# Patient Record
Sex: FEMALE | Race: WHITE | HISPANIC OR LATINO | URBAN - METROPOLITAN AREA
[De-identification: names, ages, dates, MRNs, and addresses within clinical notes are randomized per-mention and may not be internally consistent; named-entity substitution may affect disease eponyms.]

---

## 2023-08-09 ENCOUNTER — TRANSFERRED RECORDS (OUTPATIENT)
Dept: HEALTH INFORMATION MANAGEMENT | Facility: CLINIC | Age: 58
End: 2023-08-09

## 2023-11-02 ENCOUNTER — TRANSFERRED RECORDS (OUTPATIENT)
Dept: HEALTH INFORMATION MANAGEMENT | Facility: CLINIC | Age: 58
End: 2023-11-02

## 2023-11-12 ENCOUNTER — MEDICAL CORRESPONDENCE (OUTPATIENT)
Dept: HEALTH INFORMATION MANAGEMENT | Facility: CLINIC | Age: 58
End: 2023-11-12

## 2023-11-13 ENCOUNTER — TRANSCRIBE ORDERS (OUTPATIENT)
Dept: OTHER | Age: 58
End: 2023-11-13

## 2023-11-13 DIAGNOSIS — M54.16 LUMBAR RADICULOPATHY: Primary | ICD-10-CM

## 2023-11-13 DIAGNOSIS — M79.604 LEG PAIN, BILATERAL: ICD-10-CM

## 2023-11-13 DIAGNOSIS — M51.379 DEGENERATIVE DISC DISEASE AT L5-S1 LEVEL: ICD-10-CM

## 2023-11-13 DIAGNOSIS — M79.605 LEG PAIN, BILATERAL: ICD-10-CM

## 2024-01-02 DIAGNOSIS — R29.898 BILATERAL LEG WEAKNESS: Primary | ICD-10-CM

## 2024-01-02 DIAGNOSIS — M54.50 LOW BACK PAIN: ICD-10-CM

## 2024-01-08 NOTE — TELEPHONE ENCOUNTER
DIAGNOSIS: Bilateral leg weakness/pain-sharp, stabbing, and burning/stiffness/numbness/tingling-right greater than left,    APPOINTMENT DATE: 1/10/2024    Action    Action Taken 1/8/2024 1:35pm LYNDSAY     I called [facility] Ph: [phone]- they will push the MRI scan and any other images they might have access to. I provided them with our Ortho Fax # too.     I called pt Joana - the call didn't go through. I called again- Joana didn't answer. I left a  requesting a call back before 4pm if possible.     1/9/2024 8:42AM LYNDSAY   I resolved the MRI Scan from [place] in PACS    Pt Joana called back  and she was seen at [facility]  ([facility]) back in Nov 2023- Nerve study on her back and legs. She went to [facility] in [place].      I pulled the [facility] records into [facility]. Dr. Merchant's notes are viewable.     9:20AM LYNDSAY   I called [facility] in [place] ph: [phone] #3 - unavailable. I called again - unavailable.     I called Ph: [phone] - I was transferred again- I was on hold.. the phone went to a third party vendor ([employer]). I didn't leave a .     I faxed over a STAT request for the nerve study to [facility]'s fax   [phone]         NOTES STATUS DETAILS   OFFICE NOTE from referring provider Received referring provider Dr. Silvino Lazo affiliated with [facility]    OFFICE NOTE from other specialist Received [facility] Records are in [facility]   DISCHARGE SUMMARY from hospital Internal 2009 notes in EPIC   LABS     Medications- [place] (under the media tab in EPIC Received    MRI Complete - in PACS [facility] Ph:   [phone]    MRI Lumbar Spine 8/9/2023

## 2024-01-10 ENCOUNTER — ANCILLARY PROCEDURE (OUTPATIENT)
Dept: GENERAL RADIOLOGY | Facility: CLINIC | Age: 59
End: 2024-01-10
Attending: ORTHOPAEDIC SURGERY
Payer: COMMERCIAL

## 2024-01-10 ENCOUNTER — OFFICE VISIT (OUTPATIENT)
Dept: ORTHOPEDICS | Facility: CLINIC | Age: 59
End: 2024-01-10
Attending: FAMILY MEDICINE
Payer: COMMERCIAL

## 2024-01-10 ENCOUNTER — PRE VISIT (OUTPATIENT)
Dept: ORTHOPEDICS | Facility: CLINIC | Age: 59
End: 2024-01-10

## 2024-01-10 VITALS — HEIGHT: 66 IN | BODY MASS INDEX: 36.96 KG/M2 | WEIGHT: 230 LBS

## 2024-01-10 DIAGNOSIS — M54.16 LUMBAR RADICULOPATHY: ICD-10-CM

## 2024-01-10 DIAGNOSIS — E66.9 OBESITY (BMI 30-39.9): ICD-10-CM

## 2024-01-10 DIAGNOSIS — M54.50 LOW BACK PAIN: ICD-10-CM

## 2024-01-10 DIAGNOSIS — M51.369 DISC DEGENERATION, LUMBAR: Primary | ICD-10-CM

## 2024-01-10 DIAGNOSIS — M40.14 OTHER SECONDARY KYPHOSIS, THORACIC REGION: ICD-10-CM

## 2024-01-10 DIAGNOSIS — R29.898 BILATERAL LEG WEAKNESS: ICD-10-CM

## 2024-01-10 DIAGNOSIS — M42.00 SCHEURMANN'S DISEASE: ICD-10-CM

## 2024-01-10 PROCEDURE — 77073 BONE LENGTH STUDIES: CPT | Performed by: STUDENT IN AN ORGANIZED HEALTH CARE EDUCATION/TRAINING PROGRAM

## 2024-01-10 PROCEDURE — 99204 OFFICE O/P NEW MOD 45 MIN: CPT | Performed by: ORTHOPAEDIC SURGERY

## 2024-01-10 PROCEDURE — 72082 X-RAY EXAM ENTIRE SPI 2/3 VW: CPT | Performed by: STUDENT IN AN ORGANIZED HEALTH CARE EDUCATION/TRAINING PROGRAM

## 2024-01-10 PROCEDURE — 72170 X-RAY EXAM OF PELVIS: CPT | Performed by: RADIOLOGY

## 2024-01-10 PROCEDURE — 72100 X-RAY EXAM L-S SPINE 2/3 VWS: CPT | Mod: XS | Performed by: STUDENT IN AN ORGANIZED HEALTH CARE EDUCATION/TRAINING PROGRAM

## 2024-01-10 RX ORDER — LISINOPRIL 10 MG/1
10 TABLET ORAL DAILY
COMMUNITY
Start: 2023-08-10

## 2024-01-10 RX ORDER — GABAPENTIN 100 MG/1
100 CAPSULE ORAL 3 TIMES DAILY
COMMUNITY
Start: 2023-08-04

## 2024-01-10 RX ORDER — CYCLOBENZAPRINE HCL 10 MG
10 TABLET ORAL 2 TIMES DAILY PRN
COMMUNITY
Start: 2023-08-04

## 2024-01-10 NOTE — NURSING NOTE
Reason For Visit:   Chief Complaint   Patient presents with    Consult     NEW LUMBAR SPINE       Primary MD: Silvino Lazo  Ref. MD: Dr Lazo    ?  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  Work status?  Full time.  Date of injury: 6/21  Type of injury: home.  Date of surgery: Home  Type of surgery: L-5 S1.  Smoker: No  Request smoking cessation information: No    There were no vitals taken for this visit.    Pain Assessment  Patient Currently in Pain: Yes  0-10 Pain Scale: 5 (low back pain)    Oswestry (PATRICIA) Questionnaire        1/10/2024    12:06 PM   OSWESTRY DISABILITY INDEX   Count 9   Sum 29   Oswestry Score (%) 64.44 %            Neck Disability Index (NDI) Questionnaire         No data to display                       Visual Analog Pain Scale  Back Pain Scale 0-10: 6 (low back pain going into hips and down legs)  Right leg pain: 7 (pain from hip to foot with numbness and tinguling)  Left leg pain: 5 (pain from hip to foot)  Neck Pain Scale 0-10: 5 (neck and shoulder pain)  Right arm pain: 7 (thumb and wrist pain)  Left arm pain: 0    Promis 10 Assessment        1/10/2024    12:08 PM   PROMIS 10   In general, would you say your health is: 3   In general, would you say your quality of life is: 2   In general, how would you rate your physical health? 2   In general, how would you rate your mental health, including your mood and your ability to think? 3   In general, how would you rate your satisfaction with your social activities and relationships? 4   In general, please rate how well you carry out your usual social activities and roles. (This includes activities at home, at work and in your community, and responsibilities as a parent, child, spouse, employee, friend, etc.) 2   To what extent are you able to carry out your everyday physical activities such as walking, climbing stairs, carrying groceries, or moving a chair? 2   In the past 7 days, how often have you been  bothered by emotional problems such as feeling anxious, depressed, or irritable? 2   In the past 7 days, how would you rate your fatigue on average? 2   In the past 7 days, how would you rate your pain on average, where 0 means no pain, and 10 means worst imaginable pain? 6   Global Mental Health Score 13   Global Physical Health Score 11   PROMIS TOTAL - SUBSCORES 24                John Carballo MA

## 2024-01-10 NOTE — PROGRESS NOTES
Spine Surgery Consultation    REASON FOR CONSULTATION: Consult (NEW LUMBAR SPINE)       REFERRING PHYSICIAN: Silvino Lazo     HISTORY OF PRESENT ILLNESS: Joana Ceballos is a pleasant 58 year old female who presents with back pain and bilateral leg pain. Initially, she was having frequent falls and was referred to PT for leg and ankle strengthening about 2 years ago which helped with the falls.     She has back pain primarily with sitting. Back pain worsens with getting up from a chair, bending, twisting, laying and moving in bed. She has R>L leg pain with standing and walking (through the hip, thigh, knee, posterior calf, lateral foot). Has numbness in the right toes, also has paresthesia. Right leg feels weak, has difficulty going up and doing stairs. Has constipation that start suddenly after her ACDF and a covid infection. Has urinary retention and occasional stress incontinence.     EMG in 11/2023 [facility] reviewed  Abnormal study, chronic denervation of L4 and L5 nerve roots.     Conservative measures:  Injections: none, previous injections didn't help  Medications: gabapentin 100 mg TID (couldn't tolerate higher doses due to sedation), cyclobenzaprine hs (helps), APAP QID, cannot take NSAIDs  Therapy: [facility] in [place], PT recommend she obtain lumbar MRI    Spine Surgical History:  C6-C7 ACDF 1/17/22 by Dr. Liang help with left radicular symptoms  Lamecintomy x 2 L4-L5 by Dr. Marcos 2009.      Oswestry (PATRICIA) Questionnaire        1/10/2024    12:06 PM   OSWESTRY DISABILITY INDEX   Count 9   Sum 29   Oswestry Score (%) 64.44 %     PROMIS-10 Scores  Global Mental Health Score: 13  Global Physical Health Score: 11   PROMIS TOTAL - SUBSCORES: 24         1/10/2024    12:12 PM   VISUAL ANALOG PAIN SCALE   Back Pain Scale 0-10 6   Right leg pain 7   Left leg pain 5   Neck Pain Scale 0-10 5   Right arm pain 7   Left arm pain 0         REVIEW OF SYSTEMS:   See HPI for pertinent  "positive and Epic intake form    Allergies   Allergen Reactions    Cats Other (See Comments)    Penicillins Unknown and Other (See Comments)     \"something to do with breathing\"    As a child reaction uncertain   Thinks it has something to do with breathing     Other Reaction(s): *Unknown - Pt Doesn't Remember, Other, Unknown      \"something to do with breathing\"      Thinks it has something to do with breathing       As a child reaction uncertain    Thinks it has something to do with breathing    Diazepam Other (See Comments)     Patient states severe mood swings     Other Reaction(s): Hallucinations, Other      Patient states severe mood swings    Patient states severe mood swings       No past medical history on file.    No past surgical history on file.    No family history on file.    Social History     Tobacco Use    Smoking status: Not on file    Smokeless tobacco: Not on file   Substance Use Topics    Alcohol use: Not on file   No nicotine/tobacco use  Paraprofessional at a school    Current Outpatient Medications   Medication    cyclobenzaprine (FLEXERIL) 10 MG tablet    gabapentin (NEURONTIN) 100 MG capsule    lisinopril (ZESTRIL) 10 MG tablet    omeprazole (PRILOSEC) 20 MG DR capsule     No current facility-administered medications for this visit.       PHYSICAL EXAM:  There were no vitals taken for this visit.  Constitutional - Patient is healthy, well-nourished and appears stated age  Respiratory - Patient is breathing normally and in no respiratory distress.  Skin - No suspicious rashes or lesions.  Gait- raises from chair without assistance. Non-antalgic gait. Able to tandem gait.   Neurologic - Sensation decreased to lateral calf and lateral foot on RLE to light touch. DTRs patella +2, ankle + 2 L/0 R. Milner negative, ankle clonus 0 beats, plantar reflex downgoing.    Spine: overall adequate sagittal balance with head centered above pelvis and minimal effort to maintain upright posture.  Cervical " spine: normal lordosis  Nontender to palpation  Thoracic Spine: increased kyphosis  Palpation - tender to palpation at apex of thoracic kyphosis  Lumbar Spine:  Appearance - Normal  Palpation - tender at thoracolumbar junction and lumbosacral junction   ROM -  slightly limited ROM flexion, extension, rotation  Facets non-tender to palpation, facet loading + BL at  lumbosacral junction  Straight leg raise negative  Musculoskeletal:  Motor - 4/5 bilateral iliopsoas, 4+/5 R quadriceps, 5/5 L quadriceps, 5/5 hamstrings, 5/5 anterior tibialis, 4+/5 R EHL, 5/5 L extensor hallucis longus, 5/5 gastrocnemius.    Hips: bilateral hips nontender to palpation, SKYE negative, no pain with ROM  Pirifomis stretch test negative  SI joint exam:   Right Left   Judith Finger Test  - -   SKYE  + +   Thigh Thrust: + (contralateral pain) -   Pelvic Compression Test  - -   Palpation  + +   Pelvic Gapping  + -   Gaenslen s Test  - -     IMAGING: all imaging is personally reviewed and interpreted during the visit.   Scoliosis AP/lat and 6 foot EOS Spine XR, lumbar flex/ex XR, outlet pelvis XR dated 1/10/2024   Significant disc height loss L5-S1.  Wedging of multiple thoracic vertebral bodies indicating Scheurmann's kyphosis.  Postoperative changes of C6-C7 ACDF.  No evidence of hardware loosening or failure.  Sagittal measures:  Thoracic kyphosis 60 degrees  Lumbar lordosis 55 degrees  L4-S1 36.9 degrees with hyperextension of the L4-L5 disc space.  Pelvic incidence 41.8 degrees.    Maximum extension on the supine films is 44.7 degrees of lumbar lordosis.        MRI Lumbar  Spine, dated 8/9/23  Severe disc degeneration at L5-S1 with Modic endplate changes, mild bilateral foraminal stenosis.  No other significant spondylosis, degeneration, or nerve compression.    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    PLAN:   We discussed optimizing nonoperative strategies to manage her symptoms.  We discussed weight management may help reduce the load on her spine and axial back pain; we referred her to weight management program.  We also recommend referral for basivertebral ablation of L5-S1 (Intracept procedure) and she was referred to [facility].    We discussed the challenges in determining surgical options for her.  Correcting her thoracic kyphosis would involve a large multilevel thoracic fusion surgery.  It is unclear if this would help with her lumbar alignment and symptomatology.  L5-S1 fusion or disc replacement could also be considered, but again this would not help with her global sagittal malalignment.  We discussed that we do not see any severe foraminal stenosis to account for her right leg pain and weakness.  However, she may have a dynamic foraminal stenosis when she is standing and hyperlordosis that we are not seeing on the supine MRI.  A flexion and extension lumbar MRI could be pursued in the future if we need to further clarification on this.    Follow-up on an as-needed basis.    All questions and concerns were answered to the patient's apparent satisfaction before leaving the clinic. We used the patient's imaging, diagrams, models to explain the pathophysiology of their disease as well as surgical and non-surgical treatment options. The patient was also seen by Dr. Marcos who formulated and is in agreement the above plan.    Thank you for allowing us to be a part of this patient's care.     Respectfully,  Vane Last PA-C   Orthopedic Spine Surgery     Joana has several ongoing issues.  She has structural Scheuerman's kyphosis with wedging of 3 adjacent vertebra of greater than 5 degrees or more.  This results in  lumbar hyperextension in order to maintain an upright posture.  I suspect that this ongoing overloading is what initially contributed to her original lumbar disc herniation.  Since the discectomy she has developed significant Modic endplate changes at L5-S1.  This is most likely the cause of her axial based pain.  I also suspect that she is relatively hyperextending at the L4-5 segment.  On the supine MRI she does not have overt foraminal stenosis.  However she is hyperextending at the L4-5 segment in the upright posture which could be contributing to her right-sided L4 radicular component.  She does have some narrowing at the L5-S1 foramen consistent with the disc height loss.  This is also probably exacerbated by her hyperextension in order to maintain an upright posture.  We also discussed that her obesity is affecting her spine.  I explained to her that the spine sees 2-1/2-3 times body weight and so that extra 50 pounds of weight is putting 150 pounds of extra force on her spine.  Weight loss has a high probability of making things better.  Whether or not this makes things better enough is unclear.  She is significantly debilitated and has a nonreciprocating gait on stairs.  The fix for this is unclear and challenging.  The lowest risk strategy's are weight loss, basilar vertebral nerve ablation with the Relievant procedure, and then it gets more complicated.  If this is inadequate she may actually be a candidate for an L5-S1 total disc arthroplasty.  This would address both the axial pain and the alignment and the lumbosacral junction.  It would not address the thoracic hyperkyphosis in the lumbar hyperlordosis that is accommodating this.  However the undertaking to fix the thoracic hyperkyphosis is substantial and the risk-benefit ratio for this is a little less clear.    We had an extended discussion today during the visit explaining all of this in detail and utilizing a model to represent the thoracic  hyperkyphosis in the responding lumbar hyperlordosis and facet loading.  My total contact time for this visit including image ordering and interpretation, face-to-face evaluation, documentation was greater than 45 minutes.